# Patient Record
Sex: FEMALE | Race: WHITE
[De-identification: names, ages, dates, MRNs, and addresses within clinical notes are randomized per-mention and may not be internally consistent; named-entity substitution may affect disease eponyms.]

---

## 2018-03-24 ENCOUNTER — HOSPITAL ENCOUNTER (EMERGENCY)
Dept: HOSPITAL 68 - ERH | Age: 33
End: 2018-03-24
Payer: COMMERCIAL

## 2018-03-24 VITALS — SYSTOLIC BLOOD PRESSURE: 122 MMHG | DIASTOLIC BLOOD PRESSURE: 60 MMHG

## 2018-03-24 VITALS — BODY MASS INDEX: 35.31 KG/M2 | WEIGHT: 225 LBS | HEIGHT: 67 IN

## 2018-03-24 DIAGNOSIS — K80.70: Primary | ICD-10-CM

## 2018-03-24 LAB
ABSOLUTE GRANULOCYTE CT: 8.9 /CUMM (ref 1.4–6.5)
APTT BLD: 31 SEC (ref 25–37)
BASOPHILS # BLD: 0.1 /CUMM (ref 0–0.2)
BASOPHILS NFR BLD: 0.5 % (ref 0–2)
EOSINOPHIL # BLD: 0.1 /CUMM (ref 0–0.7)
EOSINOPHIL NFR BLD: 0.6 % (ref 0–5)
ERYTHROCYTE [DISTWIDTH] IN BLOOD BY AUTOMATED COUNT: 14.4 % (ref 11.5–14.5)
GRANULOCYTES NFR BLD: 79.2 % (ref 42.2–75.2)
HCT VFR BLD CALC: 45.6 % (ref 37–47)
LYMPHOCYTES # BLD: 1.7 /CUMM (ref 1.2–3.4)
MCH RBC QN AUTO: 29 PG (ref 27–31)
MCHC RBC AUTO-ENTMCNC: 32.6 G/DL (ref 33–37)
MCV RBC AUTO: 88.8 FL (ref 81–99)
MONOCYTES # BLD: 0.5 /CUMM (ref 0.1–0.6)
PLATELET # BLD: 205 /CUMM (ref 130–400)
PMV BLD AUTO: 12.6 FL (ref 7.4–10.4)
PROTHROMBIN TIME: 11.1 SEC (ref 9.4–12.5)
RED BLOOD CELL CT: 5.14 /CUMM (ref 4.2–5.4)
WBC # BLD AUTO: 11.2 /CUMM (ref 4.8–10.8)

## 2018-03-24 NOTE — ED GI/GU/ABDOMINAL COMPLAINT
History of Present Illness
 
General
Chief Complaint: Abdominal Pain/Flank Pain
Stated Complaint: ABD PAIN DX GALLSTONES
Source: patient, family
Exam Limitations: no limitations
 
Vital Signs & Intake/Output
Vital Signs & Intake/Output
 Vital Signs
 
 
Date Time Temp Pulse Resp B/P B/P Pulse O2 O2 Flow FiO2
 
     Mean Ox Delivery Rate 
 
 1647 97.6 80 18 122/60  98 Room Air  
 
 1459 98.4 84 18 118/87  99 Room Air  
 
 
 
Allergies
Coded Allergies:
No Known Drug Allergies (Intermediate, NONE 18)
 
Triage Note:
C/O R SIDED ABDOMINAL PAIN X 8 WEEKS
WITH NAUSEA.(INTERMITTANT).  DXD WITH GALL STONES
2 WEEKS AGO (HAD OUT PT US DONE).  STATES
SHE RECENTLY HAD A BABY , AND LOST 40
LBS.
Triage Nurses Notes Reviewed? yes
Pregnant? N
Is pt currently breastfeeding? No
Onset: Abrupt
Quality/Severity: sharpness, severe, stabbing, throbbing
Severity Numbers: 10
Location: right upper quadrant
Radiation: no radiation
HPI:
Patient is a 32-year-old female with a past medical history of hypothyroidism 
who had a  approximate 4 months ago who presents emergency room with 
concerns of an 8 week history of biliary colic where patient had an ultrasound 
 by outpatient primary care doctor showing concerns of gallstones and 
since patient has had intermittent right upper quadrant pain usually onset with 
eating and drinking.  Patient states that today this morning after eating 
breakfast she had acute onset of sharp stabbing severe right upper quadrant pain
and now nausea and vomiting have occurred today.  Last bowel movement was 
Thursday no blood no melena.  Denies any NSAID use or alcohol use.  Patient has 
tactile fevers and chills.
 
 
 
(Yuan Jeffery)
Reconcile Medications
Hydrocodone/Acetaminophen (Vicodin 5-300 MG Tablet) 5 MG-300 MG TABLET   1 TAB 
PO BID PRN PAIN
Ondansetron HCl (Zofran) 4 MG TABLET   1 TAB PO Q6-8P PRN NAUSEA
 
(Merritt Stiles MD)
 
Past History
 
Travel History
Traveled to [place] past 21 day No
 
Medical History
Any Pertinent Medical History? see below for history
Endocrine: hypothyroidism
 
Surgical History
Surgical History: 
 
Psychosocial History
What is your primary language English
Tobacco Use: Never used
ETOH Use: denies use
 
Family History
Hx Contributory? No
(Yuan Jeffery)
 
Review of Systems
 
Review of Systems
Constitutional:
Reports: see HPI, chills. 
EENTM:
Reports: no symptoms. 
Respiratory:
Reports: no symptoms. 
Cardiovascular:
Reports: no symptoms. 
GI:
Reports: see HPI, abdominal pain. 
Genitourinary:
Reports: no symptoms. 
Musculoskeletal:
Reports: no symptoms. 
Skin:
Reports: no symptoms. 
Neurological/Psychological:
Reports: no symptoms. 
Hematologic/Endocrine:
Reports: no symptoms. 
Immunologic/Allergic:
Reports: no symptoms. 
All Other Systems: Reviewed and Negative
(Yuan Jeffery)
 
Physical Exam
 
Physical Exam
General Appearance: mild distress, obese
Head: atraumatic
Eyes:
Bilateral: normal appearance. 
Ears, Nose, Throat, Mouth: hearing grossly normal
Neck: normal inspection
Respiratory: normal breath sounds
Cardiovascular: regular rate/rhythm
Gastrointestinal: normal bowel sounds, soft, RUQ AND EPIGASTRIC PAIN NO RLQ PAIN
NO REBOUND
Back: normal inspection
Extremities: normal range of motion
Skin: intact, normal color
 
Core Measures
ACS in differential dx? No
Sepsis Present: No
Sepsis Focused Exam Completed? No
(Yuan Jeffery)
 
Progress
Differential Diagnosis: AAA, AMI, appendicitis, biliary colic, bowel obstruction
, colon cancer, cholecystitis, diverticulitis, ectopic pregnancy, endometritis, 
esophageal varices, gastritis, hepatitis, hernia, hemorrhoids, ischemic bowel, 
inflamm bowel dis, intrauterine pregnancy, kidney stone, Irma-Leslie tear, 
ovarian cyst, ovarian torsion, pancreatitis, PID/cervicitis, peptic ulcer, PUD/
GERD, perforated viscous, SBO, threatened AB, UTI/pyelo
Plan of Care:
 Orders
 
 
Procedure Date/time Status
 
HUMAN BETA HCG SCREEN  1539 Complete
 
DIRECT BILIRUBIN  1539 Complete
 
AMYLASE  1539 Complete
 
PARTIAL THROMBOPLASTIN TIME  1537 Complete
 
PROTHROMBIN TIME  1537 Complete
 
URINE PREGNANCY  1436 Active
 
URINALYSIS  1436 Active
 
LIPASE  1436 Complete
 
COMPREHENSIVE METABOLIC PANEL  1436 Complete
 
CBC WITHOUT DIFFERENTIAL  1436 Complete
 
 
 Laboratory Tests
 
 
 
18 1539:
Anion Gap 13, Estimated GFR > 60, BUN/Creatinine Ratio 16.3, Glucose 93, Calcium
9.8, Total Bilirubin 1.1, Direct Bilirubin 0.8  H,   H,   H, 
Alkaline Phosphatase 101, Total Protein 7.5, Albumin 4.2, Globulin 3.3, Albumin/
Globulin Ratio 1.3, Amylase 41, Lipase 62, Total Beta HCG NEGATIVE, PT 11.1, INR
1.02, APTT 31, CBC w Diff NO MAN DIFF REQ, RBC 5.14, MCV 88.8, MCH 29.0, MCHC 
32.6  L, RDW 14.4, MPV 12.6  H, Gran % 79.2  H, Lymphocytes % 15.0  L, Monocytes
% 4.7, Eosinophils % 0.6, Basophils % 0.5, Absolute Granulocytes 8.9  H, 
Absolute Lymphocytes 1.7, Absolute Monocytes 0.5, Absolute Eosinophils 0.1, 
Absolute Basophils 0.1
 
18:
Direct Bilirubin Cancelled, Amylase Cancelled, Total Beta HCG Cancelled
Patient on initial presentation has concerns of biliary colic no right lower 
quadrant pain concerned at this time appendicitis patient afebrile nontoxic 
appearing
 
1634- patient was reevaluated and has significant improvement of pain and 
nausea.
 
I reviewed the blood work with patient
 
After ultrasound was obtained no concerns at this time of acute cholecystitis,
I did review patient's blood work elevation of LFT and bilirubin however this 
most likely is due to the biliary colic, patient upon discharge looks well no 
distress was able tolerate by mouth and was strongly advised to follow-up with 
discharge instructions and plan and she will comply
 
Discussed disposition and plan with dr stiles who agrees
Diagnostic Imaging:
Viewed by Me: Ultrasound. 
Radiology Impression: SEE COMMENTS
Initial ED EKG: none
Comments:
PATIENT: ELLIE PATTON  MEDICAL RECORD NO: [id number]
PRESENT AGE: 32  PATIENT ACCOUNT NO: [id number]
: 89  LOCATION: [facility]
ORDERING PHYSICIAN: Yuan BROWN  
 
  SERVICE DATE: 
EXAM TYPE: US - US-LIMITED ABDOMEN
 
EXAMINATION:
US ABDOMEN LIMITED
 
CLINICAL INFORMATION:
Right upper quadrant pain.
 
COMPARISON:
None
 
TECHNIQUE:
Real-time imaging of the right upper quadrant abdominal viscera.
 
FINDINGS:
 
PANCREAS: Normal.
 
LIVER: Normal. The liver demonstrates normal size, contour and echogenicity.
No focal lesion or intrahepatic biliary duct dilatation.
 
GALLBLADDER: There are numerous gallstones. The gallbladder is
physiologically distended, there is no wall thickening or pericholecystic
fluid.
 
COMMON BILE DUCT: Normal in caliber measuring 0.30 cm in diameter.
 
RIGHT KIDNEY: Normal. No hydronephrosis. No renal calculi or focal
parenchymal lesions. The kidney measures 11.4 cm in maximum dimension.
 
FREE FLUID: None.
 
IMPRESSION:
There are multiple gallstones, cholelithiasis, no ultrasound evidence of
cholecystitis. Exam otherwise normal.
 
DICTATED BY: Phuong Calix MD 
DATE/TIME DICTATED:18
:RAD.[email or web address] 
DATE/TIME TRANSCRIBED:18
 
(Yuan Jeffery)
 
Departure
 
Departure
Disposition: HOME OR SELF CARE
Condition: Stable
Clinical Impression
Primary Impression: Biliary colic
Secondary Impressions: Gallstones
Referrals:
Art RECIO,Javi DE LUNA (PCP/Family)
 
Masood RECIO,Macario REECE
 
Additional Instructions:
As discussed please avoid fatty foods as this may worsen your symptoms, if YOU 
develop fevers or new concerning symptom return to emergency room, on Monday 
please follow up and establish surgeon Dr. Correia for further evaluation 
treatment.  Begin over-the-counter ibuprofen for pain and inflammation begin the
prescription of Vicodin for pain.  Begin the prescription of Zofran for nausea
Prescriptions waiting at [facility]
Departure Forms:
Customer Survey
General Discharge Information
Prescriptions:
Current Visit Scripts
Ondansetron HCl (Zofran) 1 TAB PO Q6-8P PRN NAUSEA 
     #10 TAB 
 
Hydrocodone/Acetaminophen (Vicodin 5-300 MG Tablet) 1 TAB PO BID PRN PAIN 
     #12 TAB 
 
 
(Yuan Jeffery)
 
PA/NP Co-Sign Statement
Statement:
ED Attending supervision documentation-
 
 I saw and evaluated the patient. I have also reviewed all the pertinent lab 
results and diagnostic results. I agree with the findings and the plan of care 
as documented in the PA's/NP's documentation. 
 
x I have reviewed the ED Record and agree with the PA's/NP's documentation.
 
[] Additions or exceptions (if any) to the PAs/NP's note and plan are 
summarized below:
[]
 
(Go RECIO,Merritt)

## 2018-04-04 ENCOUNTER — HOSPITAL ENCOUNTER (OUTPATIENT)
Dept: HOSPITAL 68 - STS | Age: 33
End: 2018-04-04
Attending: SURGERY
Payer: COMMERCIAL

## 2018-04-04 VITALS — HEIGHT: 67 IN | BODY MASS INDEX: 36.41 KG/M2 | WEIGHT: 232 LBS

## 2018-04-04 DIAGNOSIS — K80.10: Primary | ICD-10-CM

## 2018-04-04 DIAGNOSIS — E03.9: ICD-10-CM

## 2018-04-04 PROCEDURE — C9399 UNCLASSIFIED DRUGS OR BIOLOG: HCPCS

## 2018-04-04 NOTE — OPERATIVE REPORT
Operative/Inv Procedure Report
Surgery Date: 04/04/18
Name of Procedure:
Laparoscopic cholecystectomy
Pre-Operative Diagnosis:
Biliary colic
Post-Operative Diagnosis:
Same
Estimated Blood Loss: scant
Surgeon/Assistant:
Masood RECIO,Macario REECE/Veronica BROWN
 
Anesthesia: general endotracheal tube
Drains:
None
Specimens:
Gallbladder
Operative Indication:
32-year-old woman in the early postpartum.  Presents with episodic right upper 
quadrant abdominal pain and gallstones.
 
Operative/Procedure Note
Note:
After informed consent patient is brought to the operating room and laid supine.
 General anesthesia was obtained and her abdomen was prepped and draped.  The 
skin above the umbilicus infiltrated with local anesthesia and a curvilinear 
incision made sharply.  We came down through the subcutaneous tissues bluntly 
and grasped the fascia with Aneta's.  A fasciotomy was created sharply and stay 
sutures placed.  The peritoneum was entered sharply and a blunt Tesfaye port was 
placed.  Pneumoperitoneum was achieved.  3, 5 mm ports were placed in the 
epigastrium and right upper quadrant after local anesthesia was instilled and 
under direct vision the camera.  She's placed in reverse Trendelenburg and 
rotated towards the left.  The gallbladder is identified.  It was grasped at the
dome and retracted towards the head.  Infundibulum was then grasped.  Adhesions 
to the undersurface were taken down with blunt and cautery dissection.  We 
dissected both sides the triangle Calot peritoneal tissue with cautery.  The 
artery was medial and its normal anatomic position.  It was cauterized medially 
to allow it to be mobilized away from the duct.  Triangle was cleared of areolar
tissue with cautery.  The arteries and duct were doubly ligated with clips.   
Gallbladder is removed from the fossa electrocautery.  There was a second branch
of the artery midway up on the right side.  It was clipped ligated without 
further incident.  The gallbladder was placed in Endo Catch bag and cinched up. 
Right upper quadrant was and suction irrigated normal saline.  Hemostasis 
achieved with cautery.  The ports were then removed and the gallbladder 
delivered and passed off the field.  The fascia was closed with 0 Vicryl suture.
 Skin incisions closed with 4-0 Vicryl.  Steri-Strips and sterile dressing 
applied.  Sponge and needle counts are correct.
CC:
Art RECIO,Javi DE LUNA
